# Patient Record
Sex: FEMALE | Race: BLACK OR AFRICAN AMERICAN | NOT HISPANIC OR LATINO | Employment: UNEMPLOYED | URBAN - METROPOLITAN AREA
[De-identification: names, ages, dates, MRNs, and addresses within clinical notes are randomized per-mention and may not be internally consistent; named-entity substitution may affect disease eponyms.]

---

## 2019-06-20 ENCOUNTER — OFFICE VISIT (OUTPATIENT)
Dept: FAMILY MEDICINE | Facility: CLINIC | Age: 29
End: 2019-06-20

## 2019-06-20 VITALS
HEIGHT: 67 IN | RESPIRATION RATE: 14 BRPM | DIASTOLIC BLOOD PRESSURE: 62 MMHG | WEIGHT: 158 LBS | TEMPERATURE: 98.1 F | OXYGEN SATURATION: 93 % | SYSTOLIC BLOOD PRESSURE: 114 MMHG | BODY MASS INDEX: 24.8 KG/M2 | HEART RATE: 65 BPM

## 2019-06-20 DIAGNOSIS — R07.89 MUSCULOSKELETAL CHEST PAIN: Primary | ICD-10-CM

## 2019-06-20 RX ORDER — IBUPROFEN 200 MG
400 TABLET ORAL EVERY 6 HOURS PRN
Qty: 240 TABLET | Refills: 0 | Status: SHIPPED | OUTPATIENT
Start: 2019-06-20

## 2019-06-20 SDOH — HEALTH STABILITY: MENTAL HEALTH: HOW OFTEN DO YOU HAVE A DRINK CONTAINING ALCOHOL?: NEVER

## 2019-06-20 ASSESSMENT — MIFFLIN-ST. JEOR: SCORE: 1479.31

## 2019-06-20 ASSESSMENT — PAIN SCALES - GENERAL: PAINLEVEL: SEVERE PAIN (7)

## 2019-06-20 NOTE — PROGRESS NOTES
Nursing Progress Note       Patient presented to clinic with chest pain and shoulder pain and was evaluated by Dr. Horne to rule out MI. Denies shortness of breath and dizziness. Per patient report the constant, sharp, stabbing pain began 3-4 days ago following the start using the treadmill and elliptical for cardio exercise. No hx of recent trauma to the shoulder or chest. The patient reports aggregating factors of sneezing, laughing, and deep breathing. Patient tried oils and heat, but it did not relieve the pain. Open to ibuprofen and acetaminophen but not currently taking any at this time. VS WDL. PHQ-2 WDL.     Radha Stokes SFabriceN.

## 2019-06-21 NOTE — PROGRESS NOTES
MEDICINE NOTE    CHIEF COMPLAINT: Left-sided chest pain    SUBJECTIVE:  Bettye Cueva is a 29-year old female presenting to clinic with left-sided chest and shoulder pain. Upon arrival, she was evaluated by Dr. Prabhjot Horne who determined she was not experiencing any emergent cardiac issues. She began experiencing her chest and shoulder pain 3-4 days ago after exercising. She began exercising 1 week ago after not having done so regularly in the past. She used the elliptical machine which she has used before in the past. While using it she occasionally holds the handles and exercises at a mild to moderate intensity. She does not do any weightlifting or body weight exercises involving her arms. She initially noticed it after exercising 3 nights ago. She tried exercising again 2 nights ago but had to stop because of her pain. She describes her pain as a sharp, stabbing pain with associated pinprick sensations. She denies any numbness, tingling, weakness, or lack of coordination of her arm and hand. She denies her pain radiating down her arm, up into her neck, or across her chest. She denies any right-sided chest or shoulder pain. She rates her pain as a 7/10, mostly in the morning, but it improves throughout the day to a 4-5/10. Her shoulder feels stiff in the morning but loosens up throughout the day. She states her pain is somewhat constant throughout the day but sometimes she does not notice it. Her pain is mainly exacerbated by moving her arm but also by laughing and sneezing. She has not experienced any similar episodes in the past. Her pain is improved with rest and sleep. She usually is not woken up by her pain unless she uses it to adjust her position in bed. She has stopped exercising due to her pain. She works as an  at a public school so her daily activities are not impaired unless she quickly moves her arm, especially to reach above her head. She has not tried taking OTC pain medications such as  Ibuprofen or Tylenol or applying ice or heat to the area. Last night she applied a traditional Cymro oil and herb mixture to the area which slightly improved her pain. She has noticed her pain seems worse when in air conditioned environments. She does not have other chronic conditions increasing her risk for ACS or other cardiac issues. She has not had any recent acute illnesses.    REVIEW OF SYSTEMS:  Gen: no fevers or night sweats, mild weight loss due to fasting during Ramadan  Eyes: no vision change, diplopia or red eyes  Ears, Noses, Mouth, Throat: no ringing in ears or hearing change, no epistaxis or nasal discharge, no oral lesions, throat clear  Cardiac: no palpitations  Lungs: no dyspnea, cough, or shortness of breath  GI: no nausea, vomiting, diarrhea or constipation, no abdominal pain  Skin: no concerning lesions or moles  Endo: no polyuria or polydipsia, no temperature intolerance  Heme/Lymph: no concerning bumps, no bleeding problems  Allergy: no environmental or drug allergies  Psych: no depression or anxiety    Past Medical History:   Diagnosis Date     Constipation     Resolved     Influenza 04/2019     Past Surgical History:   Procedure Laterality Date     FERTILITY SURGERY       Family History   Problem Relation Age of Onset     No Known Problems Mother      No Known Problems Father      Social History     Socioeconomic History     Marital status:      Spouse name: Not on file     Number of children: Not on file     Years of education: Not on file     Highest education level: Not on file   Occupational History     Occupation:    Social Needs     Financial resource strain: Not on file     Food insecurity:     Worry: Not on file     Inability: Not on file     Transportation needs:     Medical: Not on file     Non-medical: Not on file   Tobacco Use     Smoking status: Never Smoker     Smokeless tobacco: Never Used   Substance and Sexual Activity     Alcohol use: Never      "Frequency: Never     Drug use: Never     Sexual activity: Not on file   Lifestyle     Physical activity:     Days per week: Not on file     Minutes per session: Not on file     Stress: Not on file   Relationships     Social connections:     Talks on phone: Not on file     Gets together: Not on file     Attends Religion service: Not on file     Active member of club or organization: Not on file     Attends meetings of clubs or organizations: Not on file     Relationship status: Not on file     Intimate partner violence:     Fear of current or ex partner: Not on file     Emotionally abused: Not on file     Physically abused: Not on file     Forced sexual activity: Not on file   Other Topics Concern     Not on file   Social History Narrative     Not on file     OBJECTIVE:  Physical Exam:  /62 (BP Location: Left arm, Patient Position: Sitting, Cuff Size: Adult Regular)   Pulse 65   Temp 98.1  F (36.7  C) (Oral)   Resp 14   Ht 1.702 m (5' 7\")   Wt 71.7 kg (158 lb)   SpO2 93%   BMI 24.75 kg/m    Constitutional: no distress, comfortable, pleasant   Eyes: anicteric, normal extra-ocular movements    Cardiovascular: regular rate and rhythm, normal S1 and S2, no murmurs, rubs or gallops, peripheral pulses full and symmetric   Respiratory: clear to auscultation, no wheezes or crackles, normal breath sounds   Musculoskeletal: full range of motion, no edema, left chest wall tenderness with palpation, left shoulder tenderness with palpation  Skin: no concerning lesions, no jaundice   Neurological: cranial nerves intact, normal strength and sensation, normal gait, no tremor   Psychological: appropriate mood    ASSESSMENT/PLAN:  Bettye was seen today for chest pain.    Diagnoses and all orders for this visit:    Musculoskeletal chest pain  -     ibuprofen (ADVIL/MOTRIN) 200 MG tablet; Take 2 tablets (400 mg) by mouth every 6 hours as needed for pain      Assessment: Bettye was seen today for left chest pain for which " she was evaluated and determined to not be experiencing any cardiac issues. Given her history and onset of chest pain shortly after beginning a new exercise regimen, her symptoms are likely musculoskeletal in nature. She does not exhibit significant risk factors for cardiac disease such as not being overweight, non-diabetic, no family history of cardiac disease, non-smoker, and being of a young age. She likely does not have costochondritis given her lack of recent URI.    Plan: Take ibuprofen as prescribed as needed for pain management. Continue using and moving the arm and exercising as is comfortable. Follow-up next Monday in clinic with physical therapy for further assessment and learn exercises to alleviate symptoms and strengthen the arm and shoulder muscles.    Med Clinician: Louie Arriaza  Preceptor: Ruben Singh MD    In supervising the medical student, I repeated the exam documented above.  I have reviewed and verified the student s documentation.  Supervising Provider: Ruben Singh MD

## 2019-06-21 NOTE — PROGRESS NOTES
"[facility] Progress Note    Chief complaint: Musculoskeletal Chest Pain    Subjective:  Condition 1: Musculoskeletal Chest Pain  BOB is a 29 year old female who presents with musculoskeletal pain on the left side of her chest and the left shoulder. She states this pain started 3-4 days ago after she began exercising.  She started exercising about 1 week ago - running/walking on the treadmill and working out on the elliptical. She has also stopped exercising since the pain began. She rates her pain a 7/10 when she wakes up in the morning and a 4 or 5 out of 10 later on during the day. It is a stabbing, sharp pain that becomes worse when she moves her arm/shoulder, reaches for objects, sneezes, or laughs. The pain is also worse in colder tempetures (when the AC is on in the house). The pain is better when she is not moving and when she is sleeping. SO states that there is a pin-prick feeling associated with the pain. SO states that the pain does not radiate anywhere besides the chest and shoulder She denies any numbness, dizziness, and shortness of breath.    SO has not tried any pain medications for her pain, but she tried using some traditional herb/oil last night (6/19) which did not help much. BOB has no known drug allergies and she takes a multivitamin once daily for general health. She is not taking any other medications.       Current Outpatient Medications   Medication Sig Dispense Refill     ibuprofen (ADVIL/MOTRIN) 200 MG tablet Take 2 tablets (400 mg) by mouth every 6 hours as needed for pain 240 tablet 0         Objective:   /62 (BP Location: Left arm, Patient Position: Sitting, Cuff Size: Adult Regular)   Pulse 65   Temp 98.1  F (36.7  C) (Oral)   Resp 14   Ht 1.702 m (5' 7\")   Wt 71.7 kg (158 lb)   SpO2 93%   BMI 24.75 kg/m      Assessment:     Condition 1 - Musculoskeletal chest pain  DTP: Needs Additional Therapy: untreated condition   Rationale: SO is in need of additional drug therapy to " treat her musculoskeletal pain in her left shoulder and chest. Ibuprofen was chosen as the patient has had no previous adverse reactions to any NSAID medications and does not have any drug allergies. Ibuprofen will decrease the amount of pain SO is experiencing and will also decrease inflammation. SO is not taking any other medications that will interact with ibuprofen.       Plan:  1. Start taking ibuprofen 400 mg every 6 hours as needed for pain.  2. Return to [facility] as soon as patient is able with physical therapy fast pass to learn about stretches and exercises that will help with pain.     Pharmacy Follow-Up Plan (Method, Date, Parameters): Follow up with patient in 4 days (6/24) over the phone to assess for effectiveness and safety of ibuprofen. Assess for patients pain to determine effectiveness of ibuprofen. Assess for safety by asking if patient has experienced any side effects or adverse effects of ibuprofen, such as severe abdominal pain, nausea, vomiting, or an allergic reaction.     PharmCare Clinician: Liana Morel  Pharmacy Preceptor: Silvio Savage, PharmD    _____________________________  I am signing this for Dr. Savage who has been unable to sign this note in a timely manner.  The content of this note has been reviewed by the preceptor for accuracy.  I did not participate in the care of this patient.  Prabhjot Horne MD - Medical Director, [facility]

## 2019-06-21 NOTE — PATIENT INSTRUCTIONS
Patient Visit Summary    Patient Name: Bettye Cueva  MRN: [id number]    Date of Visit: 6/20/2019    Principle Diagnosis: Musculoskeletal chest pain    Physician's Recommendations/Instructions: Return to [facility] to visit physical therapy with fast pass at your earliest convenience. Take ibuprofen as directed by pharmacist.     Physician: Ruben Singh MD   Med Student: Louie Arriaza

## 2019-06-24 ENCOUNTER — TELEPHONE (OUTPATIENT)
Dept: FAMILY MEDICINE | Facility: CLINIC | Age: 29
End: 2019-06-24

## 2019-06-24 NOTE — TELEPHONE ENCOUNTER
----- Message from Radha Morel sent at 6/20/2019  9:07 PM CDT -----  Regarding: F/u on Monday 6/24/19 - English  SO presented with musculoskeletal pain on the left side of her chest and left shoulder. She was given a PT fast pass and plans to return to [facility] to see PT for stretches and exercises to help with the pain. She was also given a 30 day supply of ibuprofen (400 mg every 6 hrs as needed for pain). Follow up with patient in 4 days (6/24) over the phone to assess for effectiveness and safety of ibuprofen. Assess for patients pain level to determine effectiveness of ibuprofen. Assess for safety of ibuprofen by asking if patient has experienced any side effects or adverse effects of ibuprofen, such as severe abdominal pain, nausea, vomiting, or an allergic reaction.     Thanks!

## 2021-11-12 ENCOUNTER — OFFICE VISIT (OUTPATIENT)
Dept: URGENT CARE | Facility: URGENT CARE | Age: 31
End: 2021-11-12
Payer: COMMERCIAL

## 2021-11-12 VITALS
WEIGHT: 145 LBS | DIASTOLIC BLOOD PRESSURE: 74 MMHG | TEMPERATURE: 99.1 F | BODY MASS INDEX: 22.76 KG/M2 | RESPIRATION RATE: 12 BRPM | OXYGEN SATURATION: 100 % | HEART RATE: 73 BPM | HEIGHT: 67 IN | SYSTOLIC BLOOD PRESSURE: 108 MMHG

## 2021-11-12 DIAGNOSIS — Z32.01 PREGNANCY TEST POSITIVE: Primary | ICD-10-CM

## 2021-11-12 DIAGNOSIS — R10.9 ABDOMINAL CRAMPING: ICD-10-CM

## 2021-11-12 DIAGNOSIS — N92.6 MISSED MENSES: ICD-10-CM

## 2021-11-12 LAB
ALBUMIN UR-MCNC: NEGATIVE MG/DL
APPEARANCE UR: CLEAR
BILIRUB UR QL STRIP: NEGATIVE
COLOR UR AUTO: YELLOW
GLUCOSE UR STRIP-MCNC: NEGATIVE MG/DL
HCG UR QL: POSITIVE
HGB UR QL STRIP: ABNORMAL
KETONES UR STRIP-MCNC: NEGATIVE MG/DL
LEUKOCYTE ESTERASE UR QL STRIP: NEGATIVE
NITRATE UR QL: NEGATIVE
PH UR STRIP: 7 [PH] (ref 5–7)
RBC #/AREA URNS AUTO: NORMAL /HPF
SP GR UR STRIP: 1.01 (ref 1–1.03)
UROBILINOGEN UR STRIP-ACNC: 0.2 E.U./DL
WBC #/AREA URNS AUTO: NORMAL /HPF

## 2021-11-12 PROCEDURE — 81001 URINALYSIS AUTO W/SCOPE: CPT | Performed by: INTERNAL MEDICINE

## 2021-11-12 PROCEDURE — 99203 OFFICE O/P NEW LOW 30 MIN: CPT | Performed by: INTERNAL MEDICINE

## 2021-11-12 PROCEDURE — 81025 URINE PREGNANCY TEST: CPT | Performed by: INTERNAL MEDICINE

## 2021-11-12 ASSESSMENT — MIFFLIN-ST. JEOR: SCORE: 1410.35

## 2021-11-12 NOTE — PROGRESS NOTES
"ASSESSMENT AND PLAN:      ICD-10-CM    1. Pregnancy test positive  Z32.01    2. Abdominal cramping  R10.9 UA Macro with Reflex to Micro and Culture - lab collect     HCG Qual, Urine ([id number])     UA Macro with Reflex to Micro and Culture - lab collect     HCG Qual, Urine ([id number])     Urine Microscopic   3. Missed menses  N92.6      Discussed with patient that if she is having mild menstrual like cramping symptoms she could observe.  If develops vaginal bleeding or other concerns needs to go to emergency room to for ultrasound to evaluate pregnancy     patient and  plan to contact previous OB Monday to establish care for her current pregnancy    Patient Instructions         Component      Latest Ref Rng & Units 11/12/2021   HCG Qual Urine      Negative Positive (A)       If concerns for abd cramping beyond mild, or worries or worries, then ER      Patient Education     Pregnancy: Your First Trimester Changes  The first trimester is a time of rapid development for your baby. Because your baby is growing so quickly, it is important that you start a healthy lifestyle right away. By the end of the first trimester, your baby has formed all of its major body organs and weighs just over an ounce.      Actual size of baby is 1/4\"    Month 1 (weeks 1 to 4)  The placenta (the organ that nourishes your baby) begins to form. The brain, spinal cord, heart, gastrointestinal tract, and lungs begin to develop. Your baby is about   inch long by the end of the first month.      Actual size of baby is 1\"    Month 2 (weeks 5 to 8)  All of your baby s major body organs form. The face, fingers, toes, ears, and eyes appear. By the end of the month, your baby is about 1 inch long.      Actual size of baby is 3\"      Month 3 (weeks 9 to 12)  Your baby can open and close its fists and mouth. The sexual organs begin to form. As the first trimester ends, your baby is about 3 inches long.   Rosie last reviewed this educational content " on 8/1/2020 2000-2021 [employer]. All rights reserved. This information is not intended as a substitute for professional medical care. Always follow your healthcare professional's instructions.           Patient Education     Adapting to Pregnancy: First Trimester  As your body adjusts during your first trimester of pregnancy, you may have to change or limit your daily activities. You ll need more rest. You may also need to use the energy you have more wisely.   Your changing body  Almost every part of your body is affected as you adapt to pregnancy. The uterus and cervix will start to soften right away. You may not look very pregnant during the first 3 months. But you are likely to have some common signs of early pregnancy:     Nausea    Fatigue    Frequent urination    Mood swings    Bloating of the belly    Constipation    Heartburn    Missed or light periods (first trimester bleeding)    Nipple or breast tenderness and breast swelling  It s not too late to start good habits   What matters most is protecting your baby from this moment on. If you smoke, drink alcohol, or use drugs, now is the time to stop. If you need help, talk with your healthcare provider:     Smoking increases the risk of stillbirth or having a low-birth-weight baby. If you smoke, quit now.    Alcohol and drugs have been linked with miscarriage, birth defects, intellectual disability, and low birth weight. Don't drink alcohol or take drugs.  Tips to relieve nausea  During pregnancy, nausea can happen at any time of the day, but it may be worse in the morning. To help prevent nausea:     Eat small, light meals at frequent intervals.    Drink fluids often.    Get up slowly. Eat a few unsalted crackers before you get out of bed.    Avoid smells that bother you.    Avoid spicy and fatty foods.    Eat an ice pop in your favorite flavor.    Get plenty of rest.    Ask your healthcare provider about taking ruddy or vitamin B6 for  nausea and vomiting.    Talk with your healthcare provider if you take vitamins that upset your stomach.   Work concerns  The end of the first trimester is a good time to discuss working during pregnancy with your employer. Follow your healthcare provider s advice if your job needs you to stand for a long time, work with hazardous tools, or even sit at a desk all day. Your workspace, workload, or scheduled hours may need to be adjusted. Perhaps you can change body postures more often or take an extra break.   Advice for travel  Talk to your healthcare provider first, but the second trimester may be the best time for any travel. You may be advised to avoid certain trips while you re pregnant. Food and water can be concerns in developing countries. Travel by car is a good choice, as you can stop, get out, and stretch. Bring snacks and water along. Fasten the lap belt below your belly, low over your hips. Also be sure to wear the shoulder harness.   Intimacy  Unless your healthcare provider tells you to, there's no reason to stop having sex while you re pregnant. You or your partner may notice changes in desire. Desire may be less in the first trimester, due to nausea and fatigue. In the second trimester, sex may be very enjoyable. The third trimester can be a challenge comfort-wise. Try different positions and see what s best for you both.   [employer] last reviewed this educational content on 4/1/2020 2000-2021 [employer]. All rights reserved. This information is not intended as a substitute for professional medical care. Always follow your healthcare professional's instructions.           Patient Education     Pregnancy    Your exam today shows that you are pregnant.  Pregnancy symptoms  During pregnancy your body s hormones change. This causes physical and emotional changes. This is normal. Knowing what to expect is important for your piece of mind and so you know when to seek help for a problem.  Here are some of the most common symptoms:     Morning sickness or nausea. This can happen any time of the day or night.    Tender, swollen breasts    Need to urinate frequently    Tiredness or fatigue    Dizziness    Indigestion or heartburn    Food cravings or turn-offs    Constipation    Emotional changes. This can range from anxiety to excitement to depression.  General care for a healthy pregnancy  Here are things you can do to help make sure your baby is born healthy:    Rest when you feel tired. This is especially true in the later months of pregnancy.    Drink more fluids. Your body needs more fluids than you may be used to. Drink 8 to10 glasses of juice, milk, or water every day.    Eat well-balanced meals. Eat at regular times to give your body enough protein. You can expect to gain about 30 pounds during the pregnancy. Don t try to diet or lose weight while you are pregnant.    Take a prenatal vitamin every day. This helps you meet the extra nutritional needs of pregnancy.    Don t take any other medicine during your pregnancy unless your healthcare provider tells you to. This includes prescription medicines and those you buy over the counter. Many medicines can harm the growing baby.    If you have nausea or vomiting, don t eat greasy or fried foods. Eat several smaller meals throughout the day rather than 3 large meals.    If you smoke, you must stop. The nicotine you breathe in goes right to the baby.    Stay away from alcohol, even in moderate amounts. Daily drinking will harm your baby and can cause permanent brain damage.    Don t use recreational drugs, especially cocaine, crack, and heroin. These will harm your baby. Also avoid marijuana.    If you were using recreational drugs or prescribed medicine when you found out that you were pregnant, talk with your healthcare provider about possible effects on your growing baby.    If you have medical problems that you need to take medicine for, talk with  "your healthcare provider.  Follow-up care  Call your healthcare provider to arrange for prenatal care. Prenatal care is important. You can see your family provider, a pregnancy specialist (obstetrician), a midwife, or a primary care clinic.   When to seek medical advice  Call your healthcare provider right away if any of these occur:    Vaginal bleeding    Pain in your belly (abdomen) or back that is moderate or severe    Lots of vomiting, or you can t keep any fluids down for 6 hours    Burning feeling when you urinate    Headache, dizziness, or rapid weight gain    Fever    Vision changes or blurred vision  Tonara last reviewed this educational content on 11/1/2019 2000-2021 [employer]. All rights reserved. This information is not intended as a substitute for professional medical care. Always follow your healthcare professional's instructions.                     Amy Canales MD  [facility] URGENT [facility]ARE    Subjective     Bettye Cueva is a 31 year old who presents for Patient presents with:  Urgent Care  Abdominal Cramping: lower abdominal cramping x 2 weeks. LMP was 10/5/21 and took home pregnancy test which had light line    an established patient of [facility].    symptoms for > 1 week  Missed her menstrual cycle  Mild cramping like menstrual cramps.  Hoping to be pregnant  Previously did IVF twice, had miscarriages.  Attempting pregnancy since 2013    Natural pregnancy today without IVF.          Objective    /74   Pulse 73   Temp 99.1  F (37.3  C) (Oral)   Resp 12   Ht 1.702 m (5' 7\")   Wt 65.8 kg (145 lb)   LMP 10/05/2021   SpO2 100%   Breastfeeding No   BMI 22.71 kg/m    Physical Exam  Vitals reviewed.   Constitutional:       Appearance: Normal appearance.   Abdominal:      Palpations: Abdomen is soft.   Musculoskeletal:      Comments: Mid mild back pain   Neurological:      Mental Status: She is alert.          Results for orders placed or performed in " visit on 11/12/21 (from the past 24 hour(s))   UA Macro with Reflex to Micro and Culture - lab collect    Specimen: Urine, Midstream   Result Value Ref Range    Color Urine Yellow Colorless, Straw, Light Yellow, Yellow    Appearance Urine Clear Clear    Glucose Urine Negative Negative mg/dL    Bilirubin Urine Negative Negative    Ketones Urine Negative Negative mg/dL    Specific Gravity Urine 1.015 1.003 - 1.035    Blood Urine Trace (A) Negative    pH Urine 7.0 5.0 - 7.0    Protein Albumin Urine Negative Negative mg/dL    Urobilinogen Urine 0.2 0.2, 1.0 E.U./dL    Nitrite Urine Negative Negative    Leukocyte Esterase Urine Negative Negative   HCG Qual, Urine ([id number])   Result Value Ref Range    hCG Urine Qualitative Positive (A) Negative   Urine Microscopic   Result Value Ref Range    RBC Urine None Seen 0-2 /HPF /HPF    WBC Urine None Seen 0-5 /HPF /HPF    Narrative    Urine Culture not indicated

## 2021-11-12 NOTE — PATIENT INSTRUCTIONS
"    Component      Latest Ref Rng & Units 11/12/2021   HCG Qual Urine      Negative Positive (A)       If concerns for abd cramping beyond mild, or worries or worries, then ER      Patient Education     Pregnancy: Your First Trimester Changes  The first trimester is a time of rapid development for your baby. Because your baby is growing so quickly, it is important that you start a healthy lifestyle right away. By the end of the first trimester, your baby has formed all of its major body organs and weighs just over an ounce.      Actual size of baby is 1/4\"    Month 1 (weeks 1 to 4)  The placenta (the organ that nourishes your baby) begins to form. The brain, spinal cord, heart, gastrointestinal tract, and lungs begin to develop. Your baby is about   inch long by the end of the first month.      Actual size of baby is 1\"    Month 2 (weeks 5 to 8)  All of your baby s major body organs form. The face, fingers, toes, ears, and eyes appear. By the end of the month, your baby is about 1 inch long.      Actual size of baby is 3\"      Month 3 (weeks 9 to 12)  Your baby can open and close its fists and mouth. The sexual organs begin to form. As the first trimester ends, your baby is about 3 inches long.   [employer] last reviewed this educational content on 8/1/2020 2000-2021 [employer] All rights reserved. This information is not intended as a substitute for professional medical care. Always follow your healthcare professional's instructions.           Patient Education     Adapting to Pregnancy: First Trimester  As your body adjusts during your first trimester of pregnancy, you may have to change or limit your daily activities. You ll need more rest. You may also need to use the energy you have more wisely.   Your changing body  Almost every part of your body is affected as you adapt to pregnancy. The uterus and cervix will start to soften right away. You may not look very pregnant during the first 3 " months. But you are likely to have some common signs of early pregnancy:     Nausea    Fatigue    Frequent urination    Mood swings    Bloating of the belly    Constipation    Heartburn    Missed or light periods (first trimester bleeding)    Nipple or breast tenderness and breast swelling  It s not too late to start good habits   What matters most is protecting your baby from this moment on. If you smoke, drink alcohol, or use drugs, now is the time to stop. If you need help, talk with your healthcare provider:     Smoking increases the risk of stillbirth or having a low-birth-weight baby. If you smoke, quit now.    Alcohol and drugs have been linked with miscarriage, birth defects, intellectual disability, and low birth weight. Don't drink alcohol or take drugs.  Tips to relieve nausea  During pregnancy, nausea can happen at any time of the day, but it may be worse in the morning. To help prevent nausea:     Eat small, light meals at frequent intervals.    Drink fluids often.    Get up slowly. Eat a few unsalted crackers before you get out of bed.    Avoid smells that bother you.    Avoid spicy and fatty foods.    Eat an ice pop in your favorite flavor.    Get plenty of rest.    Ask your healthcare provider about taking ruddy or vitamin B6 for nausea and vomiting.    Talk with your healthcare provider if you take vitamins that upset your stomach.   Work concerns  The end of the first trimester is a good time to discuss working during pregnancy with your employer. Follow your healthcare provider s advice if your job needs you to stand for a long time, work with hazardous tools, or even sit at a desk all day. Your workspace, workload, or scheduled hours may need to be adjusted. Perhaps you can change body postures more often or take an extra break.   Advice for travel  Talk to your healthcare provider first, but the second trimester may be the best time for any travel. You may be advised to avoid certain trips  while you re pregnant. Food and water can be concerns in developing countries. Travel by car is a good choice, as you can stop, get out, and stretch. Bring snacks and water along. Fasten the lap belt below your belly, low over your hips. Also be sure to wear the shoulder harness.   Intimacy  Unless your healthcare provider tells you to, there's no reason to stop having sex while you re pregnant. You or your partner may notice changes in desire. Desire may be less in the first trimester, due to nausea and fatigue. In the second trimester, sex may be very enjoyable. The third trimester can be a challenge comfort-wise. Try different positions and see what s best for you both.   "[employer] last reviewed this educational content on 4/1/2020 2000-2021 [employer]. All rights reserved. This information is not intended as a substitute for professional medical care. Always follow your healthcare professional's instructions.           Patient Education     Pregnancy    Your exam today shows that you are pregnant.  Pregnancy symptoms  During pregnancy your body s hormones change. This causes physical and emotional changes. This is normal. Knowing what to expect is important for your piece of mind and so you know when to seek help for a problem. Here are some of the most common symptoms:     Morning sickness or nausea. This can happen any time of the day or night.    Tender, swollen breasts    Need to urinate frequently    Tiredness or fatigue    Dizziness    Indigestion or heartburn    Food cravings or turn-offs    Constipation    Emotional changes. This can range from anxiety to excitement to depression.  General care for a healthy pregnancy  Here are things you can do to help make sure your baby is born healthy:    Rest when you feel tired. This is especially true in the later months of pregnancy.    Drink more fluids. Your body needs more fluids than you may be used to. Drink 8 to10 glasses of juice, milk, or  water every day.    Eat well-balanced meals. Eat at regular times to give your body enough protein. You can expect to gain about 30 pounds during the pregnancy. Don t try to diet or lose weight while you are pregnant.    Take a prenatal vitamin every day. This helps you meet the extra nutritional needs of pregnancy.    Don t take any other medicine during your pregnancy unless your healthcare provider tells you to. This includes prescription medicines and those you buy over the counter. Many medicines can harm the growing baby.    If you have nausea or vomiting, don t eat greasy or fried foods. Eat several smaller meals throughout the day rather than 3 large meals.    If you smoke, you must stop. The nicotine you breathe in goes right to the baby.    Stay away from alcohol, even in moderate amounts. Daily drinking will harm your baby and can cause permanent brain damage.    Don t use recreational drugs, especially cocaine, crack, and heroin. These will harm your baby. Also avoid marijuana.    If you were using recreational drugs or prescribed medicine when you found out that you were pregnant, talk with your healthcare provider about possible effects on your growing baby.    If you have medical problems that you need to take medicine for, talk with your healthcare provider.  Follow-up care  Call your healthcare provider to arrange for prenatal care. Prenatal care is important. You can see your family provider, a pregnancy specialist (obstetrician), a midwife, or a primary care clinic.   When to seek medical advice  Call your healthcare provider right away if any of these occur:    Vaginal bleeding    Pain in your belly (abdomen) or back that is moderate or severe    Lots of vomiting, or you can t keep any fluids down for 6 hours    Burning feeling when you urinate    Headache, dizziness, or rapid weight gain    Fever    Vision changes or blurred vision  [employer]ie last reviewed this educational content on 11/1/2019     1645-7291 [employer]. All rights reserved. This information is not intended as a substitute for professional medical care. Always follow your healthcare professional's instructions.

## 2024-09-05 ENCOUNTER — OFFICE VISIT (OUTPATIENT)
Dept: URGENT CARE | Facility: URGENT CARE | Age: 34
End: 2024-09-05
Payer: COMMERCIAL

## 2024-09-05 VITALS
DIASTOLIC BLOOD PRESSURE: 71 MMHG | HEART RATE: 65 BPM | TEMPERATURE: 98.3 F | OXYGEN SATURATION: 100 % | SYSTOLIC BLOOD PRESSURE: 110 MMHG

## 2024-09-05 DIAGNOSIS — S61.209A OPEN WOUND OF FINGER, INITIAL ENCOUNTER: Primary | ICD-10-CM

## 2024-09-05 PROCEDURE — 12001 RPR S/N/AX/GEN/TRNK 2.5CM/<: CPT | Mod: F7 | Performed by: FAMILY MEDICINE

## 2024-09-05 PROCEDURE — 99207 PR NO CHARGE LOS: CPT | Performed by: FAMILY MEDICINE

## 2024-09-05 NOTE — PROGRESS NOTES
SUBJECTIVE:     Chief Complaint   Patient presents with    Urgent Care     Cut index and middle finger on right hand this morning.      Bettye Cueva is a 34 year old female who presents to the clinic with a laceration on the right fingers index and middle finger sustained 10 hour(s) ago.  This is a non-work related and accidental injury.    Mechanism of injury: knife.    Associated symptoms: Denies numbness, weakness, or loss of function  Last tetanus booster within 10 years: yes, 2023    EXAM:   The patient appears today in alert,no apparent distress distress  VITALS: /71 (BP Location: Left arm)   Pulse 65   Temp 98.3  F (36.8  C) (Tympanic)   SpO2 100%     Size of laceration: 2 centimeters, index finger with superficial wound  Characteristics of the laceration: bleeding- mild and irregular/semicircular  Tendon function intact: yes  Sensation to light touch intact: yes  Pulses intact: yes  Picture included in patient's chart: no    Assessment:  Open wound of finger, initial encounter    PLAN:  PROCEDURE NOTE::  Wound was locally injected with 4 cc's of Lidocaine 1% plain  Good anesthesia was obtained  Prepped and draped in the usual sterile fashion  Wound cleaned with betadine/saline solution  Wound cleaned with Shmichael-Clens  Laceration was closed using 4 4-0 nylon interrupted sutures  After care instructions:  Keep wound clean and dry for the next 24-48 hours  Sutures out in 7-10 days  Signs of infection discussed today  Apply anti-bacterial ointment for 7 days  Discussed the probability of scarring    Logan Mcgraw MD  September 5, 2024 8:17 PM

## 2024-09-06 NOTE — PATIENT INSTRUCTIONS
Keep wound clean and dry for 24 hours  Topical ointment to wound for 7 days  Monitor for wound infection    Suture removal in 7-10 days

## 2024-09-17 ENCOUNTER — OFFICE VISIT (OUTPATIENT)
Dept: URGENT CARE | Facility: URGENT CARE | Age: 34
End: 2024-09-17
Payer: COMMERCIAL

## 2024-09-17 DIAGNOSIS — S61.209A OPEN WOUND OF FINGER, INITIAL ENCOUNTER: Primary | ICD-10-CM

## 2024-09-17 PROCEDURE — 99207 PR NO CHARGE LOS: CPT

## 2024-09-17 NOTE — PROGRESS NOTES
Bettye Cueva presents to the clinic for removal of sutures. The patient has had sutures in place for 12 days. There has been no patient reported signs or symptoms of infection or drainage. 4  sutures are seen and located on the right middle finger. Tetanus status is up to date. All sutures were easily removed today. Routine wound care discussed by the RN or provider. The patient will follow up as needed.   Xochilt Asencio, HOMERO